# Patient Record
Sex: FEMALE | Race: BLACK OR AFRICAN AMERICAN | NOT HISPANIC OR LATINO
[De-identification: names, ages, dates, MRNs, and addresses within clinical notes are randomized per-mention and may not be internally consistent; named-entity substitution may affect disease eponyms.]

---

## 2018-12-11 ENCOUNTER — APPOINTMENT (OUTPATIENT)
Dept: OBGYN | Facility: CLINIC | Age: 68
End: 2018-12-11
Payer: MEDICARE

## 2018-12-11 VITALS
HEIGHT: 65 IN | WEIGHT: 182 LBS | DIASTOLIC BLOOD PRESSURE: 80 MMHG | BODY MASS INDEX: 30.32 KG/M2 | SYSTOLIC BLOOD PRESSURE: 132 MMHG

## 2018-12-11 DIAGNOSIS — Z78.9 OTHER SPECIFIED HEALTH STATUS: ICD-10-CM

## 2018-12-11 DIAGNOSIS — Z71.3 DIETARY COUNSELING AND SURVEILLANCE: ICD-10-CM

## 2018-12-11 DIAGNOSIS — T78.40XA ALLERGY, UNSPECIFIED, INITIAL ENCOUNTER: ICD-10-CM

## 2018-12-11 DIAGNOSIS — Z87.891 PERSONAL HISTORY OF NICOTINE DEPENDENCE: ICD-10-CM

## 2018-12-11 DIAGNOSIS — Z84.0 FAMILY HISTORY OF DISEASES OF THE SKIN AND SUBCUTANEOUS TISSUE: ICD-10-CM

## 2018-12-11 DIAGNOSIS — Z80.1 FAMILY HISTORY OF MALIGNANT NEOPLASM OF TRACHEA, BRONCHUS AND LUNG: ICD-10-CM

## 2018-12-11 DIAGNOSIS — Z82.49 FAMILY HISTORY OF ISCHEMIC HEART DISEASE AND OTHER DISEASES OF THE CIRCULATORY SYSTEM: ICD-10-CM

## 2018-12-11 DIAGNOSIS — E11.9 TYPE 2 DIABETES MELLITUS W/OUT COMPLICATIONS: ICD-10-CM

## 2018-12-11 DIAGNOSIS — I10 ESSENTIAL (PRIMARY) HYPERTENSION: ICD-10-CM

## 2018-12-11 PROBLEM — Z00.00 ENCOUNTER FOR PREVENTIVE HEALTH EXAMINATION: Status: ACTIVE | Noted: 2018-12-11

## 2018-12-11 PROCEDURE — 99213 OFFICE O/P EST LOW 20 MIN: CPT

## 2018-12-11 RX ORDER — ROSUVASTATIN CALCIUM 10 MG/1
10 TABLET, FILM COATED ORAL
Qty: 90 | Refills: 0 | Status: ACTIVE | COMMUNITY
Start: 2018-09-24

## 2018-12-11 RX ORDER — ESCITALOPRAM OXALATE 10 MG/1
10 TABLET, FILM COATED ORAL
Qty: 90 | Refills: 0 | Status: ACTIVE | COMMUNITY
Start: 2018-09-24

## 2019-01-30 ENCOUNTER — APPOINTMENT (OUTPATIENT)
Dept: OBGYN | Facility: CLINIC | Age: 69
End: 2019-01-30

## 2019-03-11 ENCOUNTER — RECORD ABSTRACTING (OUTPATIENT)
Age: 69
End: 2019-03-11

## 2019-03-11 DIAGNOSIS — Z83.3 FAMILY HISTORY OF DIABETES MELLITUS: ICD-10-CM

## 2019-03-11 DIAGNOSIS — Z98.890 OTHER SPECIFIED POSTPROCEDURAL STATES: ICD-10-CM

## 2019-03-11 LAB — CYTOLOGY CVX/VAG DOC THIN PREP: NORMAL

## 2019-03-11 RX ORDER — FOLIC ACID, CHOLECALCIFEROL, PYRIDOXINE HYDROCHLORIDE, CYANOCOBALAMIN, OMEGA-3 FATTY ACIDS, DOCONEXENT, ICOSAPENT 1; 1000; 12.5; 500; 500; 250; 35; 2 MG/1; [IU]/1; MG/1; UG/1; MG/1; MG/1; MG/1; MG/1
CAPSULE ORAL
Refills: 0 | Status: ACTIVE | COMMUNITY

## 2019-03-11 RX ORDER — METOPROLOL SUCCINATE 50 MG/1
50 TABLET, EXTENDED RELEASE ORAL
Refills: 0 | Status: ACTIVE | COMMUNITY

## 2019-03-11 RX ORDER — METFORMIN HYDROCHLORIDE 1000 MG/1
1000 TABLET, FILM COATED ORAL TWICE DAILY
Refills: 0 | Status: ACTIVE | COMMUNITY

## 2019-03-11 RX ORDER — AMLODIPINE BESYLATE 10 MG/1
10 TABLET ORAL DAILY
Refills: 0 | Status: ACTIVE | COMMUNITY

## 2019-05-14 ENCOUNTER — APPOINTMENT (OUTPATIENT)
Dept: OBGYN | Facility: CLINIC | Age: 69
End: 2019-05-14
Payer: MEDICARE

## 2019-05-14 VITALS
BODY MASS INDEX: 30.16 KG/M2 | WEIGHT: 181 LBS | DIASTOLIC BLOOD PRESSURE: 86 MMHG | SYSTOLIC BLOOD PRESSURE: 138 MMHG | HEIGHT: 65 IN

## 2019-05-14 DIAGNOSIS — N76.0 ACUTE VAGINITIS: ICD-10-CM

## 2019-05-14 DIAGNOSIS — D25.9 LEIOMYOMA OF UTERUS, UNSPECIFIED: ICD-10-CM

## 2019-05-14 PROCEDURE — 99213 OFFICE O/P EST LOW 20 MIN: CPT

## 2019-05-15 NOTE — PHYSICAL EXAM
[Awake] : awake [Alert] : alert [Soft] : soft [Oriented x3] : oriented to person, place, and time [Normal] : uterus [No Bleeding] : there was no active vaginal bleeding [Uterine Adnexae] : were not tender and not enlarged [Acute Distress] : no acute distress [Mass] : no breast mass [Nipple Discharge] : no nipple discharge [Axillary LAD] : no axillary lymphadenopathy [FreeTextEntry7] : enlarged fibroid uterus [Tender] : non tender

## 2019-05-28 LAB
CYTOLOGY CVX/VAG DOC THIN PREP: NORMAL
HPV HIGH+LOW RISK DNA PNL CVX: NOT DETECTED

## 2020-09-15 ENCOUNTER — RX RENEWAL (OUTPATIENT)
Age: 70
End: 2020-09-15

## 2020-12-21 PROBLEM — N76.0 ACUTE VAGINITIS: Status: RESOLVED | Noted: 2019-05-14 | Resolved: 2020-12-21

## 2022-05-31 ENCOUNTER — RX RENEWAL (OUTPATIENT)
Age: 72
End: 2022-05-31

## 2023-03-20 ENCOUNTER — HOSPITAL ENCOUNTER (OUTPATIENT)
Dept: HOSPITAL 74 - FASU | Age: 73
Discharge: HOME | End: 2023-03-20
Attending: STUDENT IN AN ORGANIZED HEALTH CARE EDUCATION/TRAINING PROGRAM
Payer: COMMERCIAL

## 2023-03-20 VITALS — HEART RATE: 68 BPM | DIASTOLIC BLOOD PRESSURE: 66 MMHG | SYSTOLIC BLOOD PRESSURE: 160 MMHG

## 2023-03-20 VITALS — TEMPERATURE: 98 F | RESPIRATION RATE: 18 BRPM

## 2023-03-20 VITALS — BODY MASS INDEX: 29.1 KG/M2

## 2023-03-20 DIAGNOSIS — H25.11: Primary | ICD-10-CM

## 2023-03-20 PROCEDURE — 66984 XCAPSL CTRC RMVL W/O ECP: CPT

## 2023-03-20 PROCEDURE — 08RJ3JZ REPLACEMENT OF RIGHT LENS WITH SYNTHETIC SUBSTITUTE, PERCUTANEOUS APPROACH: ICD-10-PCS | Performed by: STUDENT IN AN ORGANIZED HEALTH CARE EDUCATION/TRAINING PROGRAM

## 2023-03-20 PROCEDURE — V2632 POST CHMBR INTRAOCULAR LENS: HCPCS

## 2023-03-20 RX ADMIN — PHENYLEPHRINE HYDROCHLORIDE SCH DROP: 0.25 SPRAY NASAL at 09:40

## 2023-03-20 RX ADMIN — OFLOXACIN SCH DROP: 3 SOLUTION/ DROPS OPHTHALMIC at 09:35

## 2023-03-20 RX ADMIN — CYCLOPENTOLATE HYDROCHLORIDE SCH DROP: 10 SOLUTION/ DROPS OPHTHALMIC at 09:45

## 2023-03-20 RX ADMIN — TROPICAMIDE SCH DROP: 10 SOLUTION/ DROPS OPHTHALMIC at 09:40

## 2023-03-20 RX ADMIN — PHENYLEPHRINE HYDROCHLORIDE SCH DROP: 0.25 SPRAY NASAL at 09:45

## 2023-03-20 RX ADMIN — CYCLOPENTOLATE HYDROCHLORIDE SCH DROP: 10 SOLUTION/ DROPS OPHTHALMIC at 09:35

## 2023-03-20 RX ADMIN — TROPICAMIDE SCH DROP: 10 SOLUTION/ DROPS OPHTHALMIC at 09:35

## 2023-03-20 RX ADMIN — TROPICAMIDE SCH DROP: 10 SOLUTION/ DROPS OPHTHALMIC at 09:45

## 2023-03-20 RX ADMIN — OFLOXACIN SCH DROP: 3 SOLUTION/ DROPS OPHTHALMIC at 09:40

## 2023-03-20 RX ADMIN — KETOROLAC TROMETHAMINE SCH DROP: 5 SOLUTION OPHTHALMIC at 09:40

## 2023-03-20 RX ADMIN — KETOROLAC TROMETHAMINE SCH DROP: 5 SOLUTION OPHTHALMIC at 09:45

## 2023-03-20 RX ADMIN — PHENYLEPHRINE HYDROCHLORIDE SCH DROP: 0.25 SPRAY NASAL at 09:35

## 2023-03-20 RX ADMIN — CYCLOPENTOLATE HYDROCHLORIDE SCH DROP: 10 SOLUTION/ DROPS OPHTHALMIC at 09:40

## 2023-03-20 RX ADMIN — OFLOXACIN SCH DROP: 3 SOLUTION/ DROPS OPHTHALMIC at 09:45

## 2023-03-20 RX ADMIN — KETOROLAC TROMETHAMINE SCH DROP: 5 SOLUTION OPHTHALMIC at 09:35

## 2023-04-17 ENCOUNTER — HOSPITAL ENCOUNTER (OUTPATIENT)
Dept: HOSPITAL 74 - FASU | Age: 73
Discharge: HOME | End: 2023-04-17
Attending: STUDENT IN AN ORGANIZED HEALTH CARE EDUCATION/TRAINING PROGRAM
Payer: COMMERCIAL

## 2023-04-17 VITALS — TEMPERATURE: 97.1 F | RESPIRATION RATE: 20 BRPM

## 2023-04-17 VITALS — SYSTOLIC BLOOD PRESSURE: 159 MMHG | DIASTOLIC BLOOD PRESSURE: 68 MMHG | HEART RATE: 58 BPM

## 2023-04-17 VITALS — BODY MASS INDEX: 29.1 KG/M2

## 2023-04-17 DIAGNOSIS — H25.12: Primary | ICD-10-CM

## 2023-04-17 PROCEDURE — 08RK3JZ REPLACEMENT OF LEFT LENS WITH SYNTHETIC SUBSTITUTE, PERCUTANEOUS APPROACH: ICD-10-PCS | Performed by: STUDENT IN AN ORGANIZED HEALTH CARE EDUCATION/TRAINING PROGRAM

## 2023-04-17 PROCEDURE — 66984 XCAPSL CTRC RMVL W/O ECP: CPT

## 2023-04-17 PROCEDURE — V2632 POST CHMBR INTRAOCULAR LENS: HCPCS

## 2023-04-17 RX ADMIN — KETOROLAC TROMETHAMINE SCH DROP: 5 SOLUTION OPHTHALMIC at 09:20

## 2023-04-17 RX ADMIN — CYCLOPENTOLATE HYDROCHLORIDE SCH DROP: 10 SOLUTION/ DROPS OPHTHALMIC at 09:25

## 2023-04-17 RX ADMIN — CYCLOPENTOLATE HYDROCHLORIDE SCH DROP: 10 SOLUTION/ DROPS OPHTHALMIC at 09:15

## 2023-04-17 RX ADMIN — KETOROLAC TROMETHAMINE SCH DROP: 5 SOLUTION OPHTHALMIC at 09:25

## 2023-04-17 RX ADMIN — OFLOXACIN SCH DROP: 3 SOLUTION/ DROPS OPHTHALMIC at 09:25

## 2023-04-17 RX ADMIN — CYCLOPENTOLATE HYDROCHLORIDE SCH DROP: 10 SOLUTION/ DROPS OPHTHALMIC at 09:20

## 2023-04-17 RX ADMIN — PHENYLEPHRINE HYDROCHLORIDE SCH DROP: 0.25 SPRAY NASAL at 09:20

## 2023-04-17 RX ADMIN — PHENYLEPHRINE HYDROCHLORIDE SCH DROP: 0.25 SPRAY NASAL at 09:25

## 2023-04-17 RX ADMIN — PHENYLEPHRINE HYDROCHLORIDE SCH DROP: 0.25 SPRAY NASAL at 09:15

## 2023-04-17 RX ADMIN — TROPICAMIDE SCH DROP: 10 SOLUTION/ DROPS OPHTHALMIC at 09:25

## 2023-04-17 RX ADMIN — OFLOXACIN SCH DROP: 3 SOLUTION/ DROPS OPHTHALMIC at 09:15

## 2023-04-17 RX ADMIN — OFLOXACIN SCH DROP: 3 SOLUTION/ DROPS OPHTHALMIC at 09:20

## 2023-04-17 RX ADMIN — KETOROLAC TROMETHAMINE SCH DROP: 5 SOLUTION OPHTHALMIC at 09:15

## 2023-04-17 RX ADMIN — TROPICAMIDE SCH DROP: 10 SOLUTION/ DROPS OPHTHALMIC at 09:15

## 2023-04-17 RX ADMIN — TROPICAMIDE SCH DROP: 10 SOLUTION/ DROPS OPHTHALMIC at 09:20

## 2023-10-24 ENCOUNTER — TRANSCRIPTION ENCOUNTER (OUTPATIENT)
Age: 73
End: 2023-10-24

## 2023-11-06 ENCOUNTER — TRANSCRIPTION ENCOUNTER (OUTPATIENT)
Age: 73
End: 2023-11-06

## 2023-11-16 ENCOUNTER — APPOINTMENT (OUTPATIENT)
Dept: HEART AND VASCULAR | Facility: CLINIC | Age: 73
End: 2023-11-16

## 2023-11-16 ENCOUNTER — APPOINTMENT (OUTPATIENT)
Dept: PAIN MANAGEMENT | Facility: CLINIC | Age: 73
End: 2023-11-16

## 2023-11-20 ENCOUNTER — APPOINTMENT (OUTPATIENT)
Dept: NEUROSURGERY | Facility: CLINIC | Age: 73
End: 2023-11-20
Payer: MEDICARE

## 2023-11-20 ENCOUNTER — RESULT REVIEW (OUTPATIENT)
Age: 73
End: 2023-11-20

## 2023-11-20 VITALS
OXYGEN SATURATION: 100 % | HEIGHT: 65 IN | WEIGHT: 181 LBS | DIASTOLIC BLOOD PRESSURE: 70 MMHG | BODY MASS INDEX: 30.16 KG/M2 | SYSTOLIC BLOOD PRESSURE: 137 MMHG | HEART RATE: 72 BPM

## 2023-11-20 PROCEDURE — 99215 OFFICE O/P EST HI 40 MIN: CPT | Mod: 24

## 2023-11-30 ENCOUNTER — APPOINTMENT (OUTPATIENT)
Dept: HEART AND VASCULAR | Facility: CLINIC | Age: 73
End: 2023-11-30
Payer: MEDICARE

## 2023-11-30 ENCOUNTER — NON-APPOINTMENT (OUTPATIENT)
Age: 73
End: 2023-11-30

## 2023-11-30 VITALS
WEIGHT: 162.19 LBS | HEART RATE: 76 BPM | SYSTOLIC BLOOD PRESSURE: 132 MMHG | DIASTOLIC BLOOD PRESSURE: 70 MMHG | TEMPERATURE: 97.6 F | HEIGHT: 65 IN | OXYGEN SATURATION: 98 % | BODY MASS INDEX: 27.02 KG/M2 | RESPIRATION RATE: 18 BRPM

## 2023-11-30 PROCEDURE — 99204 OFFICE O/P NEW MOD 45 MIN: CPT | Mod: 25

## 2023-11-30 PROCEDURE — 93000 ELECTROCARDIOGRAM COMPLETE: CPT

## 2024-02-01 ENCOUNTER — RESULT REVIEW (OUTPATIENT)
Age: 74
End: 2024-02-01

## 2024-02-04 PROBLEM — I67.1 CEREBRAL ANEURYSM: Status: ACTIVE | Noted: 2024-02-04

## 2024-02-05 ENCOUNTER — APPOINTMENT (OUTPATIENT)
Dept: NEUROSURGERY | Facility: CLINIC | Age: 74
End: 2024-02-05
Payer: MEDICARE

## 2024-02-05 VITALS
BODY MASS INDEX: 26.99 KG/M2 | HEART RATE: 62 BPM | WEIGHT: 162 LBS | DIASTOLIC BLOOD PRESSURE: 71 MMHG | OXYGEN SATURATION: 98 % | SYSTOLIC BLOOD PRESSURE: 152 MMHG | HEIGHT: 65 IN

## 2024-02-05 DIAGNOSIS — I67.1 CEREBRAL ANEURYSM, NONRUPTURED: ICD-10-CM

## 2024-02-05 DIAGNOSIS — S06.5XAA TRAUMATIC SUBDURAL HEMORRHAGE WITH LOSS OF CONSCIOUSNESS STATUS UNKNOWN, INITIAL ENCOUNTER: ICD-10-CM

## 2024-02-05 PROCEDURE — 99215 OFFICE O/P EST HI 40 MIN: CPT

## 2024-02-05 NOTE — END OF VISIT
[FreeTextEntry3] : I have seen the patient and reviewed the case together with PA and I agree with the final recommendations and plan of care.  Jose Pizano MD Neurosurgery  [Time Spent: ___ minutes] : I have spent [unfilled] minutes of time on the encounter. [>50% of the face to face encounter time was spent on counseling and/or coordination of care for ___] : Greater than 50% of the face to face encounter time was spent on counseling and/or coordination of care for [unfilled]

## 2024-02-05 NOTE — PHYSICAL EXAM
[General Appearance - Alert] : alert [General Appearance - In No Acute Distress] : in no acute distress [Clean] : clean [Dry] : dry [Well-Healed] : well-healed [Cranial Nerves Optic (II)] : visual acuity intact bilaterally,  pupils equal round and reactive to light [Cranial Nerves Oculomotor (III)] : extraocular motion intact [Cranial Nerves Trigeminal (V)] : facial sensation intact symmetrically [Cranial Nerves Facial (VII)] : face symmetrical [Cranial Nerves Vestibulocochlear (VIII)] : hearing was intact bilaterally [Cranial Nerves Glossopharyngeal (IX)] : tongue and palate midline [Cranial Nerves Accessory (XI - Cranial And Spinal)] : head turning and shoulder shrug symmetric [Cranial Nerves Hypoglossal (XII)] : there was no tongue deviation with protrusion [Motor Strength] : muscle strength was normal in all four extremities [Sensation Tactile Decrease] : light touch was intact [Abnormal Walk] : normal gait [FreeTextEntry1] : KENDRICK solano

## 2024-02-05 NOTE — ASSESSMENT
[FreeTextEntry1] : Ms. Choi has been doing well since her right sided craniotomy for evacuation of acute SDH on 10/20/23.  Her incision is well healed at this point.  She should continue the current home PT/OT course.   She should return to the office as needed at this point.   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 Tyrone Carson for further treatment plan.  She should be considered for diagnostic cerebral angiogram, as there was no recent head trauma that the patient recalls prior to the headaches and subsequent diagnosis of the subdural hematoma.    The patient understands the plan of care and is in agreement with it.

## 2024-02-05 NOTE — HISTORY OF PRESENT ILLNESS
[FreeTextEntry1] : Ms. Choi returns today for interval follow up and imaging review for her right SDH.  The patient denies headaches, visual change, numbness, weakness of extremities, speech disturbance, dizziness, neck pain, vertigo.  She has undergone follow up CT Head on 2/1/24 which reveals complete resolution of previous SDH.    11/20/23: MAYNOR CHOI is a 73 year female with a PMH of HTN, HLD, DM who presents to the office today for neurosurgical postoperative follow up s/p Right craniotomy for evacuation of SDH performed on 10/20/23 during hospitalization at [facility] from 10/20/23 to 10/24/23 for symptoms of 3 days of cough, malaise, headache N/V and findings of acute right subdural hematoma with midline shift and brain compression on imaging work up.  She completed 7 day course of keppra.  Her hospital postoperative course was uneventful and she had short stay in inpatient acute rehab at [facility].  She was ultimately discharged home with home care on 11/6.  She has been doing well since discharge.  The patient denies headaches, visual change, numbness, weakness of extremities, speech disturbance, dizziness, neck pain, vertigo, fevers, wound drainage.  She has undergone follow up CT scan today, which was independently reviewed by me and shows stability. Surg Hx: R craniotomy for SDH evacuation on 10/20 Meds: Doxycycline, gabapentin, hydralazine, lisinopril, metformin, metoprolol, nortryptyline, norvasc, rosuvastatin.  Allergies: Iodinated contrast, PCN, codeine

## 2024-02-05 NOTE — DATA REVIEWED
[de-identified] : Exam Date:      02/01/24 Exam:         CT HEAD-NON STROKE Order#:       CT [id number]    Clinical indications: Follow-up subdural collection   Multiple axial sections were performed from the base of skull to vertex without contrast enhancement. Coronal and sagittal reconstructions were performed.   This exam is compared prior head CT performed on November 20, 2023   Postoperative changes compatible high right frontal craniotomy is again seen and unchanged   Previously noted subdural collection has resolved when compared to the prior exam.   There is no acute hemorrhage mass or mass effect seen   Evaluation of the osseous which probably window appears unremarkable   The visualized paranasal sinuses mastoid and middle ear regions appear clear.   IMPRESSION: Previously noted subdural collection has resolved.   --- End of Report ---  ***Electronically Signed *** ----------------------------------------------- Lucio Moeller MD              02/01/24 1823  Dictated on 02/01/24

## 2024-02-10 ENCOUNTER — RESULT REVIEW (OUTPATIENT)
Age: 74
End: 2024-02-10

## 2024-02-12 ENCOUNTER — APPOINTMENT (OUTPATIENT)
Dept: NEUROSURGERY | Facility: CLINIC | Age: 74
End: 2024-02-12
Payer: MEDICARE

## 2024-02-12 PROCEDURE — G2211 COMPLEX E/M VISIT ADD ON: CPT

## 2024-02-12 PROCEDURE — 99215 OFFICE O/P EST HI 40 MIN: CPT

## 2024-02-12 PROCEDURE — 99205 OFFICE O/P NEW HI 60 MIN: CPT

## 2024-02-12 NOTE — HISTORY OF PRESENT ILLNESS
[de-identified] : Ms. Deana Choi was seen in neurosurgical consultation at the request of my partner, Dr. Jose Pizano.  She is a 73 year-old female with PMHx HTN, HLD, DM.   She presented to [facility] in October 2023 with complaint of malaise, headache, and nausea/vomiting x 3 days.  CT head non-contrast showed acute right subdural hematoma with midline shift.  She underwent right craniotomy for evacuation of subdural hematoma under the direction of Dr. Pizano on 10/20/23.  Her postoperative course was uncomplicated and she was discharged home on 11/6/23 after short stay in [place] inpatient rehab.  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  Presents today with updated imaging detailed below.  Ms. Choi reports possible family history of 1st degree cousin, who suffered a sudden death due to intracranial hemorrhage believed to be secondary to ruptured intracranial aneurysm.  She is a non-smoker. Denies thunderclap headache.   Burow's Graft Text: The defect edges were debeveled with a #15 scalpel blade.  Given the location of the defect, shape of the defect, the proximity to free margins and the presence of a standing cone deformity a Burow's skin graft was deemed most appropriate. The standing cone was removed and this tissue was then trimmed to the shape of the primary defect. The adipose tissue was also removed until only dermis and epidermis were left.  The skin margins of the secondary defect were undermined to an appropriate distance in all directions utilizing iris scissors.  The secondary defect was closed with interrupted buried subcutaneous sutures.  The skin edges were then re-apposed with running  sutures.  The skin graft was then placed in the primary defect and oriented appropriately.

## 2024-02-12 NOTE — ASSESSMENT
[FreeTextEntry1] : Ms. Choi presents status post right craniotomy for evacuation of subdural hematoma in October 2023.  CTA performed on admission reviewed independently by me demonstrates an approximately 2mm outpouching in the region of the distal cavernous segment of the left internal carotid artery.  These findings appear stable on MRA brain performed 2/10/2024.l  Natural history discussed. Alternative management strategies reviewed, including diagnostic cerebral angiography to investigate the size, morphology, and location of the aneurysm in more detail, and annual clinical and imaging surveillance. I explained that diagnostic angiography would assist in determining the cavernous versus subarachnoid location of the aneurysm, but is unlikely to alter management recommendations given the small size of the aneurysm. The patient would prefer to defer angiography at this time, which I think is reasonable. I have therefore recommended MRA brain in 1 year with an appointment to follow.  Signs and symptoms of subarachnoid hemorrhage were reviewed and the patient was instructed to attend the emergency department in the event these were to occur.  I have asked the patient to contact me for any symptomatic development or progression at which time we can obtain expedited follow up imaging.  A total of 60 minutes was spent relative to this encounter. None

## 2024-02-12 NOTE — DATA REVIEWED
[de-identified] : Patient Name:	   MAYNOR GRECO		Location:	[facility] Rec #: 	  [id number]		Account #: 	[id number]	 YOB: 1950		Ordering:	Juan Jose Funez MD	 Age: 73	      Sex:    F		Attending:		 PCP:	     NO PRIMARY CARE PHYSICIAN ______________________________________________________________________________________ 						 Exam Date: 	  10/20/23					 Exam: 	CT ANGIO BRAIN STROKE [facility] IC					 Order#:	CT [id number]					                Stroke code.    Technique: CT angiogram of the intracranial carotid arteries was performed was performed utilizing helical slices from the base of the skull through the vertex during bolus injection of intravenous contrast material. Overlapping reconstructions were obtained to allow for sagittal and coronal reformatted images . 3-D images were created from the data set. Images were also included.   Findings: Right holocerebral hemispheric acute on chronic subdural hemorrhage causing mass effect and midline shift. Please see CT of the brain performed at the same time.  Evaluation of the anterior circulation demonstrates a small outpouching along the left lateral distal cavernous internal carotid artery (image #255 series 6) measuring approximately 2 mm that may represent an infundibulum versus aneurysm normal caliber of the distal internal carotid arteries. There is a normal appearance to the bilateral anterior cerebral and middle cerebral arteries.  Evaluation the posterior circulation demonstrates normal course and caliber of the bilateral vertebral arteries, vertebrobasilar junction and basilar artery. The bilateral posterior cerebral arteries are also within normal limits.    There is no evidence of stenosis .    CT angiogram of the extracranial carotid arteries:  Technique: CT angiogram of the extracranial carotid arteries was performed was performed utilizing helical slices from the base of the skull through the mediastinum during bolus injection of intravenous contrast material. Overlapping reconstructions were obtained to allow for sagittal and coronal reformatted images. 3-D reconstruction was also performed.  Findings: Evaluation of the aortic arch and the origin of the great vessels are within normal limits.  The course and caliber of the bilateral common carotid, internal carotid and external carotid arteries are within normal limits. There is no evidence of focal stenosis or dissection.  The origin of the vertebral arteries are within normal limits without evidence of stenosis. The course and caliber of the cervical segments of the vertebral arteries are normal. The right vertebral artery is hypoplastic. The left vertebral artery is dominant.  There is enlargement of the pulmonary artery that may represent pulmonary artery hypertension.    Impression:  Left distal cavernous/supraclinoid segment of the internal carotid artery 2 mm outpouching that may represent a left aneurysm versus infundibulum for the left ophthalmic artery.  Normal CTA of the extracranial circulation. No evidence of carotid stenosis.  --- End of Report ---  ***Electronically Signed *** ----------------------------------------------- Edilma Flynn MD              10/20/23 2100  Dictated on 10/20/23   Report cc:  Juan Jose Funez MD;  [de-identified] : 	 [facility]                                          [address], [place]  [address]                                        Department of Radiology                                             [phone]   Patient Name:      MAYNOR GRECO                Location:       [facility] Rec #:        [id number]                    Account #:      [id number] YOB: 1950                    Ordering:       Koki Perkins Age: 73               Sex:    F                 Attending:      Koki Perkins PCP:        Vladimir Bustillo MD ______________________________________________________________________________________  Exam Date:      02/10/24 Exam:         MRA BRAIN Order#:       MRI [id number]    CLINICAL STATEMENT: Follow-up aneurysm   TECHNIQUE: MRA of the head was performed without gadolinium. 3-D MIP images were obtained.   COMPARISON: CTA head 10/20/2023   FINDINGS:  The proximal anterior, middle and posterior cerebral arteries are patent without hemodynamically significant stenosis.   The intracranial vertebral and basilar arteries are patent. Dominant left vertebral artery noted   3 mm outpouching pointing laterally at the distal cavernous segment of left internal carotid artery noted unchanged given differences in technique. This likely represents aneurysm more likely than infundibulum of left ophthalmic artery.   IMPRESSION:  Stable distal left internal carotid artery 3 mm outpouching as described above   --- End of Report ---  ***Electronically Signed *** ----------------------------------------------- Pancho Montoya MD              02/11/24 1459  Dictated on 02/11/24   Report cc:  Koki Perkins; [de-identified] : [facility]                                          [address], [place]  [address]                                        Department of Radiology                                             [phone]   Patient Name:      MAYNOR GRECO                Location:       [facility] Rec #:        [id number]                    Account #:      [id number] YOB: 1950                    Ordering:       Koki Perkins Age: 73               Sex:    F                 Attending:      Koki Perkins PCP:        Vladimir Bustillo MD ______________________________________________________________________________________  Exam Date:      02/10/24 Exam:         MRA BRAIN Order#:       MRI [id number]    CLINICAL STATEMENT: Follow-up aneurysm   TECHNIQUE: MRA of the head was performed without gadolinium. 3-D MIP images were obtained.   COMPARISON: CTA head 10/20/2023   FINDINGS:  The proximal anterior, middle and posterior cerebral arteries are patent without hemodynamically significant stenosis.   The intracranial vertebral and basilar arteries are patent. Dominant left vertebral artery noted   3 mm outpouching pointing laterally at the distal cavernous segment of left internal carotid artery noted unchanged given differences in technique. This likely represents aneurysm more likely than infundibulum of left ophthalmic artery.   IMPRESSION:  Stable distal left internal carotid artery 3 mm outpouching as described above   --- End of Report ---  ***Electronically Signed *** ----------------------------------------------- Pancho Montoya MD              02/11/24 1459  Dictated on 02/11/24   Report cc:  Koki Perkins;

## 2024-02-12 NOTE — PHYSICAL EXAM

## 2024-05-17 ENCOUNTER — NON-APPOINTMENT (OUTPATIENT)
Age: 74
End: 2024-05-17

## 2024-05-17 ENCOUNTER — APPOINTMENT (OUTPATIENT)
Dept: HEART AND VASCULAR | Facility: CLINIC | Age: 74
End: 2024-05-17
Payer: MEDICARE

## 2024-05-17 VITALS
DIASTOLIC BLOOD PRESSURE: 78 MMHG | HEART RATE: 55 BPM | SYSTOLIC BLOOD PRESSURE: 142 MMHG | OXYGEN SATURATION: 97 % | BODY MASS INDEX: 27.49 KG/M2 | HEIGHT: 65 IN | WEIGHT: 165 LBS

## 2024-05-17 PROCEDURE — 93000 ELECTROCARDIOGRAM COMPLETE: CPT

## 2024-05-17 PROCEDURE — 99214 OFFICE O/P EST MOD 30 MIN: CPT

## 2024-05-17 RX ORDER — METFORMIN HYDROCHLORIDE 500 MG/1
500 TABLET, EXTENDED RELEASE ORAL
Qty: 360 | Refills: 0 | Status: DISCONTINUED | COMMUNITY
Start: 2018-09-28 | End: 2024-05-17

## 2024-05-17 RX ORDER — AMLODIPINE BESYLATE 5 MG/1
5 TABLET ORAL
Qty: 90 | Refills: 0 | Status: DISCONTINUED | COMMUNITY
Start: 2018-10-02 | End: 2024-05-17

## 2024-05-17 RX ORDER — FLUCONAZOLE 150 MG/1
150 TABLET ORAL DAILY
Qty: 1 | Refills: 12 | Status: DISCONTINUED | COMMUNITY
Start: 2019-05-14 | End: 2024-05-17

## 2024-05-17 RX ORDER — METOPROLOL SUCCINATE 50 MG/1
50 TABLET, EXTENDED RELEASE ORAL
Qty: 90 | Refills: 0 | Status: DISCONTINUED | COMMUNITY
Start: 2018-09-23 | End: 2024-05-17

## 2024-05-17 RX ORDER — QUINAPRIL AND HYDROCHLOROTHIAZIDE 12.5; 2 MG/1; MG/1
20-12.5 TABLET ORAL
Qty: 90 | Refills: 0 | Status: DISCONTINUED | COMMUNITY
Start: 2018-11-18 | End: 2024-05-17

## 2024-05-17 RX ORDER — PANTOPRAZOLE 40 MG/1
40 TABLET, DELAYED RELEASE ORAL
Qty: 90 | Refills: 0 | Status: DISCONTINUED | COMMUNITY
Start: 2018-06-24 | End: 2024-05-17

## 2024-05-17 RX ORDER — HYDRALAZINE HYDROCHLORIDE 25 MG/1
25 TABLET ORAL 3 TIMES DAILY
Refills: 0 | Status: ACTIVE | COMMUNITY

## 2024-05-17 NOTE — HISTORY OF PRESENT ILLNESS
[FreeTextEntry1] : 74 YO F with HTN, DM, subdural hematoma S/P recent surgery and hospitalization. Patient has been slowly recovering and today was the first time she got out alone and came to the visit. She is taking her meds Patient has had no CP/SPB/LIM/palpitations/syncope before or after the surgery. TTE in the hospital is unremarkable   5/17/24 Patient is recovering. Some headache. No CP/MALLY/LIM/palpitations/syncope. EKG unchanged. Will follow the patient in 6-12 months once she is recovered fully

## 2024-05-17 NOTE — DISCUSSION/SUMMARY
[FreeTextEntry1] : 74 YO F with HTN, DM, subdural hematoma S/P recent surgery and hospitalization here for a regular visit for HTN. Asymptomatic   Lipid panel Continue the statin  Increase the hydralazine to 75 TID due to HTN F/U in a year       [EKG obtained to assist in diagnosis and management of assessed problem(s)] : EKG obtained to assist in diagnosis and management of assessed problem(s)

## 2024-05-23 LAB
CHOLEST SERPL-MCNC: 186 MG/DL
HDLC SERPL-MCNC: 83 MG/DL
LDLC SERPL CALC-MCNC: 76 MG/DL
NONHDLC SERPL-MCNC: 104 MG/DL
TRIGL SERPL-MCNC: 164 MG/DL

## 2025-05-29 ENCOUNTER — NON-APPOINTMENT (OUTPATIENT)
Age: 75
End: 2025-05-29

## 2025-05-29 ENCOUNTER — APPOINTMENT (OUTPATIENT)
Dept: HEART AND VASCULAR | Facility: CLINIC | Age: 75
End: 2025-05-29
Payer: MEDICARE

## 2025-05-29 VITALS
HEART RATE: 68 BPM | DIASTOLIC BLOOD PRESSURE: 62 MMHG | OXYGEN SATURATION: 95 % | SYSTOLIC BLOOD PRESSURE: 140 MMHG | WEIGHT: 175 LBS | BODY MASS INDEX: 29.16 KG/M2 | HEIGHT: 65 IN

## 2025-05-29 PROCEDURE — 99213 OFFICE O/P EST LOW 20 MIN: CPT

## 2025-05-29 PROCEDURE — 93000 ELECTROCARDIOGRAM COMPLETE: CPT

## 2025-05-29 RX ORDER — METFORMIN HYDROCHLORIDE 1000 MG/1
1000 TABLET, COATED ORAL
Refills: 0 | Status: ACTIVE | COMMUNITY

## 2025-05-29 RX ORDER — HYDRALAZINE HYDROCHLORIDE 25 MG/1
25 TABLET ORAL 3 TIMES DAILY
Refills: 0 | Status: ACTIVE | COMMUNITY

## 2025-05-29 RX ORDER — METFORMIN HYDROCHLORIDE 500 MG/1
500 TABLET, COATED ORAL
Refills: 0 | Status: ACTIVE | COMMUNITY

## 2025-05-29 RX ORDER — LISINOPRIL 10 MG/1
10 TABLET ORAL
Qty: 90 | Refills: 0 | Status: ACTIVE | COMMUNITY
Start: 2025-05-29 | End: 1900-01-01

## 2025-06-10 ENCOUNTER — APPOINTMENT (OUTPATIENT)
Dept: CARDIOLOGY | Facility: CLINIC | Age: 75
End: 2025-06-10
Payer: MEDICARE

## 2025-06-10 VITALS
OXYGEN SATURATION: 95 % | WEIGHT: 174.5 LBS | DIASTOLIC BLOOD PRESSURE: 70 MMHG | HEART RATE: 93 BPM | HEIGHT: 65 IN | BODY MASS INDEX: 29.07 KG/M2 | SYSTOLIC BLOOD PRESSURE: 140 MMHG

## 2025-06-10 PROCEDURE — 36415 COLL VENOUS BLD VENIPUNCTURE: CPT

## 2025-06-11 LAB
ANION GAP SERPL CALC-SCNC: 13 MMOL/L
BUN SERPL-MCNC: 14 MG/DL
CALCIUM SERPL-MCNC: 9.4 MG/DL
CHLORIDE SERPL-SCNC: 104 MMOL/L
CO2 SERPL-SCNC: 24 MMOL/L
CREAT SERPL-MCNC: 0.96 MG/DL
EGFRCR SERPLBLD CKD-EPI 2021: 62 ML/MIN/1.73M2
GLUCOSE SERPL-MCNC: 99 MG/DL
POTASSIUM SERPL-SCNC: 4.2 MMOL/L
SODIUM SERPL-SCNC: 141 MMOL/L

## 2025-08-26 ENCOUNTER — RX RENEWAL (OUTPATIENT)
Age: 75
End: 2025-08-26